# Patient Record
Sex: FEMALE | Race: WHITE | NOT HISPANIC OR LATINO | URBAN - METROPOLITAN AREA
[De-identification: names, ages, dates, MRNs, and addresses within clinical notes are randomized per-mention and may not be internally consistent; named-entity substitution may affect disease eponyms.]

---

## 2020-10-12 ENCOUNTER — LAB OUTSIDE AN ENCOUNTER (OUTPATIENT)
Dept: URBAN - METROPOLITAN AREA CLINIC 113 | Facility: CLINIC | Age: 35
End: 2020-10-12

## 2020-10-12 ENCOUNTER — OFFICE VISIT (OUTPATIENT)
Dept: URBAN - METROPOLITAN AREA CLINIC 113 | Facility: CLINIC | Age: 35
End: 2020-10-12
Payer: COMMERCIAL

## 2020-10-12 ENCOUNTER — WEB ENCOUNTER (OUTPATIENT)
Dept: URBAN - METROPOLITAN AREA CLINIC 113 | Facility: CLINIC | Age: 35
End: 2020-10-12

## 2020-10-12 VITALS
RESPIRATION RATE: 18 BRPM | HEIGHT: 63 IN | DIASTOLIC BLOOD PRESSURE: 76 MMHG | BODY MASS INDEX: 22.32 KG/M2 | HEART RATE: 66 BPM | TEMPERATURE: 98.1 F | SYSTOLIC BLOOD PRESSURE: 121 MMHG | WEIGHT: 126 LBS

## 2020-10-12 DIAGNOSIS — R19.4 CHANGE IN BOWEL HABITS: ICD-10-CM

## 2020-10-12 DIAGNOSIS — R14.3 GAS AND BLOATING: ICD-10-CM

## 2020-10-12 DIAGNOSIS — R10.13 EPIGASTRIC PAIN: ICD-10-CM

## 2020-10-12 PROCEDURE — G8427 DOCREV CUR MEDS BY ELIG CLIN: HCPCS | Performed by: INTERNAL MEDICINE

## 2020-10-12 PROCEDURE — 99204 OFFICE O/P NEW MOD 45 MIN: CPT | Performed by: INTERNAL MEDICINE

## 2020-10-12 PROCEDURE — G8420 CALC BMI NORM PARAMETERS: HCPCS | Performed by: INTERNAL MEDICINE

## 2020-10-12 PROCEDURE — G8484 FLU IMMUNIZE NO ADMIN: HCPCS | Performed by: INTERNAL MEDICINE

## 2020-10-12 PROCEDURE — 1036F TOBACCO NON-USER: CPT | Performed by: INTERNAL MEDICINE

## 2020-10-12 RX ORDER — SALICYLIC ACID 3 G/100G
1 TABLET LOTION TOPICAL ONCE A DAY
Status: ACTIVE | COMMUNITY

## 2020-10-12 RX ORDER — DIAPER,BRIEF,YOUTH,DISPOSABLE
1 TABLET EACH MISCELLANEOUS ONCE A DAY
Status: ACTIVE | COMMUNITY

## 2020-10-12 RX ORDER — FOLIC ACID 0.8 MG
1 TABLET TABLET ORAL ONCE A DAY
Status: ACTIVE | COMMUNITY

## 2020-10-12 RX ORDER — CHOLECALCIFEROL (VITAMIN D3) 50 MCG
1 TABLET TABLET ORAL ONCE A DAY
Status: ACTIVE | COMMUNITY

## 2020-10-12 RX ORDER — LEVONORGESTREL AND ETHINYL ESTRADIOL 0.1-0.02MG
1 TABLET KIT ORAL ONCE A DAY
Status: ACTIVE | COMMUNITY

## 2020-10-12 NOTE — HPI-TODAY'S VISIT:
Patient presents today for initial evaluation.  She reports she is had GI problems her whole life.  She reports abdominal discomfort located predominant epigastric area associate with gas and bloating.  She can have bowel movement changes including diarrhea.  The symptoms seem to come and go.  She cannot find a true food mediated phenomenon although she has done some food testing.  She is concerned about the possibility of celiac disease.  She also complains of a lot of joint difficulties and joint swelling.  This is despite an extensive laboratory work-up which she describes a negative sed rate and autoimmune markers.  She wanted to see rheumatology but she cannot get an appointment because she has no abnormal findings.  She is not having fevers or chills.  There is no blood in her stool.  No unusual weight loss.  No vision changes.

## 2020-10-21 ENCOUNTER — CLAIMS CREATED FROM THE CLAIM WINDOW (OUTPATIENT)
Dept: URBAN - METROPOLITAN AREA CLINIC 4 | Facility: CLINIC | Age: 35
End: 2020-10-21
Payer: COMMERCIAL

## 2020-10-21 ENCOUNTER — OFFICE VISIT (OUTPATIENT)
Dept: URBAN - METROPOLITAN AREA SURGERY CENTER 25 | Facility: SURGERY CENTER | Age: 35
End: 2020-10-21
Payer: COMMERCIAL

## 2020-10-21 DIAGNOSIS — K31.89 OTHER DISEASES OF STOMACH AND DUODENUM: ICD-10-CM

## 2020-10-21 DIAGNOSIS — B37.81 CANDIDAL ESOPHAGITIS: ICD-10-CM

## 2020-10-21 DIAGNOSIS — B37.81 CANDIDA ESOPHAGITIS: ICD-10-CM

## 2020-10-21 PROCEDURE — 88305 TISSUE EXAM BY PATHOLOGIST: CPT | Performed by: PATHOLOGY

## 2020-10-21 PROCEDURE — 88312 SPECIAL STAINS GROUP 1: CPT | Performed by: PATHOLOGY

## 2020-10-21 PROCEDURE — 43239 EGD BIOPSY SINGLE/MULTIPLE: CPT | Performed by: INTERNAL MEDICINE

## 2020-10-21 PROCEDURE — 88342 IMHCHEM/IMCYTCHM 1ST ANTB: CPT | Performed by: PATHOLOGY

## 2020-10-21 RX ORDER — DIAPER,BRIEF,YOUTH,DISPOSABLE
1 TABLET EACH MISCELLANEOUS ONCE A DAY
Status: ACTIVE | COMMUNITY

## 2020-10-21 RX ORDER — SALICYLIC ACID 3 G/100G
1 TABLET LOTION TOPICAL ONCE A DAY
Status: ACTIVE | COMMUNITY

## 2020-10-21 RX ORDER — LEVONORGESTREL AND ETHINYL ESTRADIOL 0.1-0.02MG
1 TABLET KIT ORAL ONCE A DAY
Status: ACTIVE | COMMUNITY

## 2020-10-21 RX ORDER — CHOLECALCIFEROL (VITAMIN D3) 50 MCG
1 TABLET TABLET ORAL ONCE A DAY
Status: ACTIVE | COMMUNITY

## 2020-10-21 RX ORDER — FOLIC ACID 0.8 MG
1 TABLET TABLET ORAL ONCE A DAY
Status: ACTIVE | COMMUNITY

## 2020-10-26 ENCOUNTER — TELEPHONE ENCOUNTER (OUTPATIENT)
Dept: URBAN - METROPOLITAN AREA CLINIC 113 | Facility: CLINIC | Age: 35
End: 2020-10-26

## 2020-10-26 RX ORDER — FLUCONAZOLE 100 MG/1
1 TABLET TABLET ORAL ONCE DAILY
Qty: 7 | Refills: 0 | OUTPATIENT
Start: 2020-10-26 | End: 2020-11-01

## 2020-11-23 ENCOUNTER — OFFICE VISIT (OUTPATIENT)
Dept: URBAN - METROPOLITAN AREA CLINIC 113 | Facility: CLINIC | Age: 35
End: 2020-11-23
Payer: COMMERCIAL

## 2020-11-23 VITALS
HEIGHT: 63 IN | RESPIRATION RATE: 18 BRPM | HEART RATE: 66 BPM | DIASTOLIC BLOOD PRESSURE: 76 MMHG | TEMPERATURE: 99.3 F | WEIGHT: 128 LBS | BODY MASS INDEX: 22.68 KG/M2 | SYSTOLIC BLOOD PRESSURE: 117 MMHG

## 2020-11-23 DIAGNOSIS — R14.3 GAS AND BLOATING: ICD-10-CM

## 2020-11-23 DIAGNOSIS — R10.13 EPIGASTRIC PAIN: ICD-10-CM

## 2020-11-23 DIAGNOSIS — R19.4 CHANGE IN BOWEL HABITS: ICD-10-CM

## 2020-11-23 PROBLEM — 88111009 CHANGE IN BOWEL HABIT: Status: ACTIVE | Noted: 2020-10-12

## 2020-11-23 PROBLEM — 271832001 FLATULENCE, ERUCTATION AND GAS PAIN: Status: ACTIVE | Noted: 2020-10-12

## 2020-11-23 PROBLEM — 79922009 EPIGASTRIC PAIN: Status: ACTIVE | Noted: 2020-10-12

## 2020-11-23 PROCEDURE — G8420 CALC BMI NORM PARAMETERS: HCPCS | Performed by: INTERNAL MEDICINE

## 2020-11-23 PROCEDURE — G8427 DOCREV CUR MEDS BY ELIG CLIN: HCPCS | Performed by: INTERNAL MEDICINE

## 2020-11-23 PROCEDURE — 1036F TOBACCO NON-USER: CPT | Performed by: INTERNAL MEDICINE

## 2020-11-23 PROCEDURE — G8483 FLU IMM NO ADMIN DOC REA: HCPCS | Performed by: INTERNAL MEDICINE

## 2020-11-23 PROCEDURE — 99213 OFFICE O/P EST LOW 20 MIN: CPT | Performed by: INTERNAL MEDICINE

## 2020-11-23 RX ORDER — SALICYLIC ACID 3 G/100G
1 TABLET LOTION TOPICAL ONCE A DAY
Status: ACTIVE | COMMUNITY

## 2020-11-23 RX ORDER — CHOLECALCIFEROL (VITAMIN D3) 50 MCG
1 TABLET TABLET ORAL ONCE A DAY
Status: ACTIVE | COMMUNITY

## 2020-11-23 RX ORDER — DIAPER,BRIEF,YOUTH,DISPOSABLE
1 TABLET EACH MISCELLANEOUS ONCE A DAY
Status: ACTIVE | COMMUNITY

## 2020-11-23 RX ORDER — FOLIC ACID 0.8 MG
1 TABLET TABLET ORAL ONCE A DAY
Status: ACTIVE | COMMUNITY

## 2020-11-23 RX ORDER — LEVONORGESTREL AND ETHINYL ESTRADIOL 0.1-0.02MG
1 TABLET KIT ORAL ONCE A DAY
Status: ACTIVE | COMMUNITY

## 2020-11-23 NOTE — HPI-TODAY'S VISIT:
Patient returns today in follow-up.  She is generally doing quite well.  She has episodes of queasiness and nausea.  No associated vomiting.  No blood in the stool.  She is eating and drinking well.  Her EGD results were reviewed with her.  She did have some candidal esophagitis and was treated with Diflucan.  She denies any swallowing difficulties.  Her main complaint remains joint swelling.

## 2022-07-09 ENCOUNTER — TELEPHONE ENCOUNTER (OUTPATIENT)
Dept: URBAN - METROPOLITAN AREA CLINIC 121 | Facility: CLINIC | Age: 37
End: 2022-07-09

## 2022-07-10 ENCOUNTER — TELEPHONE ENCOUNTER (OUTPATIENT)
Dept: URBAN - METROPOLITAN AREA CLINIC 121 | Facility: CLINIC | Age: 37
End: 2022-07-10

## 2022-07-10 RX ORDER — OMEPRAZOLE 40 MG/1
CAPSULE, DELAYED RELEASE ORAL
Refills: 0 | Status: ACTIVE | COMMUNITY
Start: 2010-10-22

## 2022-07-10 RX ORDER — CHOLECALCIFEROL (VITAMIN D3) 50 MCG
TABLET ORAL
Refills: 0 | Status: ACTIVE | COMMUNITY
Start: 2010-10-22

## 2022-07-10 RX ORDER — HYOSCYAMINE SULFATE 0.12 MG/1
1-2 TABS EVERY 4 HOURS AS NEEDED TABLET ORAL AS NEEDED
Refills: 0 | Status: ACTIVE | COMMUNITY
Start: 2010-12-08

## 2022-07-10 RX ORDER — SUCRALFATE 1 G/10ML
10ML AC/HS SUSPENSION ORAL
Refills: 2 | Status: ACTIVE | COMMUNITY
Start: 2010-10-22

## 2022-07-10 RX ORDER — LEVONORGESTREL AND ETHINYL ESTRADIOL 0.1-0.02MG
KIT ORAL
Refills: 0 | Status: ACTIVE | COMMUNITY
Start: 2010-10-22

## 2022-11-10 ENCOUNTER — TELEPHONE ENCOUNTER (OUTPATIENT)
Dept: URBAN - METROPOLITAN AREA CLINIC 72 | Facility: CLINIC | Age: 37
End: 2022-11-10

## 2023-03-10 ENCOUNTER — OFFICE VISIT (OUTPATIENT)
Dept: URBAN - METROPOLITAN AREA CLINIC 113 | Facility: CLINIC | Age: 38
End: 2023-03-10
Payer: COMMERCIAL

## 2023-03-10 ENCOUNTER — DASHBOARD ENCOUNTERS (OUTPATIENT)
Age: 38
End: 2023-03-10

## 2023-03-10 VITALS
WEIGHT: 120 LBS | TEMPERATURE: 98.2 F | HEIGHT: 63 IN | BODY MASS INDEX: 21.26 KG/M2 | SYSTOLIC BLOOD PRESSURE: 110 MMHG | DIASTOLIC BLOOD PRESSURE: 67 MMHG | HEART RATE: 61 BPM | RESPIRATION RATE: 16 BRPM

## 2023-03-10 DIAGNOSIS — Z80.0 FAMILY HISTORY OF COLON CANCER: ICD-10-CM

## 2023-03-10 DIAGNOSIS — Z83.71 FAMILY HISTORY OF COLONIC POLYPS: ICD-10-CM

## 2023-03-10 DIAGNOSIS — D68.1 FACTOR XI DEFICIENCY: ICD-10-CM

## 2023-03-10 PROBLEM — 312824007: Status: ACTIVE | Noted: 2023-03-10

## 2023-03-10 PROBLEM — 767713001: Status: ACTIVE | Noted: 2023-03-10

## 2023-03-10 PROBLEM — 429969003: Status: ACTIVE | Noted: 2023-03-10

## 2023-03-10 PROCEDURE — 99213 OFFICE O/P EST LOW 20 MIN: CPT | Performed by: NURSE PRACTITIONER

## 2023-03-10 RX ORDER — SALICYLIC ACID 3 G/100G
1 TABLET LOTION TOPICAL ONCE A DAY
Status: ACTIVE | COMMUNITY

## 2023-03-10 RX ORDER — CHOLECALCIFEROL (VITAMIN D3) 50 MCG
TABLET ORAL
Refills: 0 | Status: ON HOLD | COMMUNITY
Start: 2010-10-22

## 2023-03-10 RX ORDER — OMEPRAZOLE 40 MG/1
CAPSULE, DELAYED RELEASE ORAL
Refills: 0 | Status: ON HOLD | COMMUNITY
Start: 2010-10-22

## 2023-03-10 RX ORDER — HYOSCYAMINE SULFATE 0.12 MG/1
1-2 TABS EVERY 4 HOURS AS NEEDED TABLET ORAL AS NEEDED
Refills: 0 | Status: ON HOLD | COMMUNITY
Start: 2010-12-08

## 2023-03-10 RX ORDER — CHOLECALCIFEROL (VITAMIN D3) 50 MCG
1 TABLET TABLET ORAL ONCE A DAY
Status: ACTIVE | COMMUNITY

## 2023-03-10 RX ORDER — LEVONORGESTREL AND ETHINYL ESTRADIOL 0.1-0.02MG
1 TABLET KIT ORAL ONCE A DAY
Status: ON HOLD | COMMUNITY

## 2023-03-10 RX ORDER — SUCRALFATE 1 G/10ML
10ML AC/HS SUSPENSION ORAL
Refills: 2 | Status: ON HOLD | COMMUNITY
Start: 2010-10-22

## 2023-03-10 RX ORDER — FOLIC ACID 0.8 MG
1 TABLET TABLET ORAL ONCE A DAY
Status: ON HOLD | COMMUNITY

## 2023-03-10 RX ORDER — MAGNESIUM GLUCONATE 27 MG(500)
1 TABLET TABLET ORAL ONCE A DAY
Status: ACTIVE | COMMUNITY

## 2023-03-10 RX ORDER — LEVONORGESTREL AND ETHINYL ESTRADIOL 0.1-0.02MG
KIT ORAL
Refills: 0 | Status: ON HOLD | COMMUNITY
Start: 2010-10-22

## 2023-03-10 RX ORDER — DIAPER,BRIEF,YOUTH,DISPOSABLE
1 TABLET EACH MISCELLANEOUS ONCE A DAY
Status: ACTIVE | COMMUNITY

## 2023-03-10 NOTE — HPI-TODAY'S VISIT:
This is a 38-year-old woman with a history of epigastric pain, gas, bloating and change in bowel habits, presenting to discuss a colonoscopy in the setting of a family history of colon cancer.  She has previously been followed in the office for queasiness and nausea with work-up including fairly normal EGD other than candidal esophagitis treated with Diflucan.  She was recommended to consider buspirone for chronic nausea or queasiness, however she did not feel ready to initiate therapy at her last visit.  She was to contact for worsening symptoms.  She has a family history of colon cancer in her paternal grandmother, and multiple distant relatives on the paternal side, as well as a maternal uncle with colon cancer around age 60. Her father has a history of precancerous colon polyps removed starting at age 37. She has a history of colonoscopy at age 10 for "massive colonic bleeding" without identified cause of bleeding. She was diagnosed with Factor XI deficiency. She is doing okay from a GI symptom standpoint. She states that she follows with functional medicine specialist out of [place]. She can have bloating symptoms at times that are mostly managed with dietary modification. There is no dysphagia. She has heartburn on occasion alleviated with ginger as needed. Nausea is improved. She has bowel movements daily without blood per rectum.

## 2023-03-10 NOTE — PHYSICAL EXAM CONSTITUTIONAL:
normal , pleasant, well nourished, in no acute distress Class IV (difficult) - the soft palate is not visible at all

## 2023-05-16 ENCOUNTER — WEB ENCOUNTER (OUTPATIENT)
Dept: URBAN - METROPOLITAN AREA SURGERY CENTER 25 | Facility: SURGERY CENTER | Age: 38
End: 2023-05-16

## 2023-05-18 ENCOUNTER — CLAIMS CREATED FROM THE CLAIM WINDOW (OUTPATIENT)
Dept: URBAN - METROPOLITAN AREA SURGERY CENTER 25 | Facility: SURGERY CENTER | Age: 38
End: 2023-05-18
Payer: COMMERCIAL

## 2023-05-18 ENCOUNTER — CLAIMS CREATED FROM THE CLAIM WINDOW (OUTPATIENT)
Dept: URBAN - METROPOLITAN AREA SURGERY CENTER 25 | Facility: SURGERY CENTER | Age: 38
End: 2023-05-18

## 2023-05-18 DIAGNOSIS — Z80.0 FAMILY HISTORY OF COLON CANCER: ICD-10-CM

## 2023-05-18 DIAGNOSIS — Z83.71 FAMILY HISTORY OF COLONIC POLYPS: ICD-10-CM

## 2023-05-18 DIAGNOSIS — Z12.11 COLON CANCER SCREENING: ICD-10-CM

## 2023-05-18 PROCEDURE — G8907 PT DOC NO EVENTS ON DISCHARG: HCPCS | Performed by: INTERNAL MEDICINE

## 2023-05-18 PROCEDURE — G0105 COLORECTAL SCRN; HI RISK IND: HCPCS | Performed by: INTERNAL MEDICINE

## 2023-05-18 RX ORDER — CHOLECALCIFEROL (VITAMIN D3) 50 MCG
1 TABLET TABLET ORAL ONCE A DAY
Status: ACTIVE | COMMUNITY

## 2023-05-18 RX ORDER — LEVONORGESTREL AND ETHINYL ESTRADIOL 0.1-0.02MG
1 TABLET KIT ORAL ONCE A DAY
Status: ON HOLD | COMMUNITY

## 2023-05-18 RX ORDER — HYOSCYAMINE SULFATE 0.12 MG/1
1-2 TABS EVERY 4 HOURS AS NEEDED TABLET ORAL AS NEEDED
Refills: 0 | Status: ON HOLD | COMMUNITY
Start: 2010-12-08

## 2023-05-18 RX ORDER — OMEPRAZOLE 40 MG/1
CAPSULE, DELAYED RELEASE ORAL
Refills: 0 | Status: ON HOLD | COMMUNITY
Start: 2010-10-22

## 2023-05-18 RX ORDER — LEVONORGESTREL AND ETHINYL ESTRADIOL 0.1-0.02MG
KIT ORAL
Refills: 0 | Status: ON HOLD | COMMUNITY
Start: 2010-10-22

## 2023-05-18 RX ORDER — SUCRALFATE 1 G/10ML
10ML AC/HS SUSPENSION ORAL
Refills: 2 | Status: ON HOLD | COMMUNITY
Start: 2010-10-22

## 2023-05-18 RX ORDER — DIAPER,BRIEF,YOUTH,DISPOSABLE
1 TABLET EACH MISCELLANEOUS ONCE A DAY
Status: ACTIVE | COMMUNITY

## 2023-05-18 RX ORDER — CHOLECALCIFEROL (VITAMIN D3) 50 MCG
TABLET ORAL
Refills: 0 | Status: ON HOLD | COMMUNITY
Start: 2010-10-22

## 2023-05-18 RX ORDER — MAGNESIUM GLUCONATE 27 MG(500)
1 TABLET TABLET ORAL ONCE A DAY
Status: ACTIVE | COMMUNITY

## 2023-05-18 RX ORDER — SALICYLIC ACID 3 G/100G
1 TABLET LOTION TOPICAL ONCE A DAY
Status: ACTIVE | COMMUNITY

## 2023-05-18 RX ORDER — FOLIC ACID 0.8 MG
1 TABLET TABLET ORAL ONCE A DAY
Status: ON HOLD | COMMUNITY